# Patient Record
Sex: MALE | Race: WHITE | Employment: FULL TIME | URBAN - METROPOLITAN AREA
[De-identification: names, ages, dates, MRNs, and addresses within clinical notes are randomized per-mention and may not be internally consistent; named-entity substitution may affect disease eponyms.]

---

## 2020-12-31 ENCOUNTER — OFFICE VISIT (OUTPATIENT)
Dept: ORTHOPEDIC SURGERY | Age: 25
End: 2020-12-31
Payer: COMMERCIAL

## 2020-12-31 VITALS — BODY MASS INDEX: 26.92 KG/M2 | TEMPERATURE: 98.8 F | WEIGHT: 188 LBS | HEIGHT: 70 IN

## 2020-12-31 PROBLEM — S93.492A SPRAIN OF ANTERIOR TALOFIBULAR LIGAMENT OF LEFT ANKLE: Status: ACTIVE | Noted: 2020-12-31

## 2020-12-31 PROCEDURE — G8484 FLU IMMUNIZE NO ADMIN: HCPCS | Performed by: ORTHOPAEDIC SURGERY

## 2020-12-31 PROCEDURE — 99203 OFFICE O/P NEW LOW 30 MIN: CPT | Performed by: ORTHOPAEDIC SURGERY

## 2020-12-31 PROCEDURE — G8419 CALC BMI OUT NRM PARAM NOF/U: HCPCS | Performed by: ORTHOPAEDIC SURGERY

## 2020-12-31 PROCEDURE — 1036F TOBACCO NON-USER: CPT | Performed by: ORTHOPAEDIC SURGERY

## 2020-12-31 PROCEDURE — G8428 CUR MEDS NOT DOCUMENT: HCPCS | Performed by: ORTHOPAEDIC SURGERY

## 2020-12-31 RX ORDER — PROMETHAZINE HYDROCHLORIDE 12.5 MG/1
12.5-25 TABLET ORAL EVERY 6 HOURS PRN
COMMUNITY
Start: 2020-11-27

## 2020-12-31 RX ORDER — TAMSULOSIN HYDROCHLORIDE 0.4 MG/1
CAPSULE ORAL
COMMUNITY
Start: 2020-11-12

## 2020-12-31 RX ORDER — ONDANSETRON 4 MG/1
TABLET, ORALLY DISINTEGRATING ORAL
COMMUNITY
Start: 2020-11-27

## 2020-12-31 RX ORDER — OXYBUTYNIN CHLORIDE 5 MG/1
TABLET, EXTENDED RELEASE ORAL
COMMUNITY
Start: 2020-12-03

## 2020-12-31 RX ORDER — CIPROFLOXACIN 500 MG/1
TABLET, FILM COATED ORAL
COMMUNITY
Start: 2020-11-01

## 2020-12-31 RX ORDER — PANTOPRAZOLE SODIUM 40 MG/1
40 TABLET, DELAYED RELEASE ORAL DAILY
COMMUNITY
Start: 2020-07-31

## 2020-12-31 NOTE — PROGRESS NOTES
History reviewed. No pertinent family history. Pertinent items are noted in HPI  Review of systems reviewed from Patient History Form dated on 12/31/2020 and available in the patient's chart under the Media tab. No change. PHYSICAL EXAM:  Mr. Hung Yarbrough is a very pleasant 26 y.o.  male who presents today in no acute distress, awake, alert, and oriented. He is well dressed, nourished and  groomed. Patient with normal affect. Height is  5' 10\" (1.778 m), weight is 188 lb (85.3 kg). Resting respiratory rate is 16. Examination of the gait, showed that the patient walks with a limp, WB left leg . Examination of both ankles showing a decreased range of motion of the left ankle compare to the other side because of pain. There is mild swelling that can be seen, as well as ecchymosis over lateral side of the left ankle. He has intact sensation and good pedal pulses. He has significant tenderness on deep palpation over the left ankle ATF. The ankles are stable to drawer test bilaterally, equally.  Ankle reflex 1+ bilaterally. IMAGING:  Xray's taken today in the office, were reviewed, 3 views of the left ankle, and showed no acute fracture. Ankle mortise in anatomic position. IMPRESSION: Left ankle sprain. PLAN:  I assured the patient that the xray is negative for acute fracture. The xray findings were reviewed with the patient and explained to his that the pain is 2ry to ankle sprain, and that it should continue to improve the next 3-4 weeks. He can be WBAT, and avoid heavy impact acitivity and work on peroneal muscle strength. F/U in 4 weeks, with repeat xray if still in pain. PT if needed.       René Thomson MD